# Patient Record
Sex: FEMALE | Race: WHITE | URBAN - METROPOLITAN AREA
[De-identification: names, ages, dates, MRNs, and addresses within clinical notes are randomized per-mention and may not be internally consistent; named-entity substitution may affect disease eponyms.]

---

## 2017-01-28 ENCOUNTER — APPOINTMENT (OUTPATIENT)
Dept: GENERAL RADIOLOGY | Facility: CLINIC | Age: 22
End: 2017-01-28
Attending: EMERGENCY MEDICINE
Payer: COMMERCIAL

## 2017-01-28 ENCOUNTER — HOSPITAL ENCOUNTER (EMERGENCY)
Facility: CLINIC | Age: 22
Discharge: HOME | End: 2017-01-28
Attending: EMERGENCY MEDICINE | Admitting: EMERGENCY MEDICINE
Payer: COMMERCIAL

## 2017-01-28 ENCOUNTER — APPOINTMENT (OUTPATIENT)
Dept: ULTRASOUND IMAGING | Facility: CLINIC | Age: 22
End: 2017-01-28
Attending: EMERGENCY MEDICINE
Payer: COMMERCIAL

## 2017-01-28 VITALS
HEART RATE: 75 BPM | DIASTOLIC BLOOD PRESSURE: 72 MMHG | TEMPERATURE: 98 F | OXYGEN SATURATION: 100 % | RESPIRATION RATE: 18 BRPM | WEIGHT: 115 LBS | BODY MASS INDEX: 21.71 KG/M2 | HEIGHT: 61 IN | SYSTOLIC BLOOD PRESSURE: 104 MMHG

## 2017-01-28 DIAGNOSIS — M25.511 ACUTE PAIN OF RIGHT SHOULDER: ICD-10-CM

## 2017-01-28 DIAGNOSIS — R10.11 RUQ ABDOMINAL PAIN: ICD-10-CM

## 2017-01-28 LAB
ALBUMIN SERPL-MCNC: 4.7 G/DL (ref 3.4–5)
ALBUMIN UR-MCNC: NEGATIVE MG/DL
ALP SERPL-CCNC: 79 U/L (ref 40–150)
ALT SERPL W P-5'-P-CCNC: 22 U/L (ref 0–50)
ANION GAP SERPL CALCULATED.3IONS-SCNC: 8 MMOL/L (ref 3–14)
APPEARANCE UR: CLEAR
AST SERPL W P-5'-P-CCNC: 11 U/L (ref 0–35)
BASOPHILS # BLD AUTO: 0 10E9/L (ref 0–0.2)
BASOPHILS NFR BLD AUTO: 0.3 %
BILIRUB SERPL-MCNC: 0.7 MG/DL (ref 0.2–1.3)
BILIRUB UR QL STRIP: NEGATIVE
BUN SERPL-MCNC: 9 MG/DL (ref 7–30)
CALCIUM SERPL-MCNC: 9.3 MG/DL (ref 8.5–10.1)
CHLORIDE SERPL-SCNC: 104 MMOL/L (ref 96–110)
CO2 SERPL-SCNC: 29 MMOL/L (ref 20–32)
COLOR UR AUTO: ABNORMAL
CREAT SERPL-MCNC: 0.63 MG/DL (ref 0.5–1)
DIFFERENTIAL METHOD BLD: NORMAL
EOSINOPHIL # BLD AUTO: 0.1 10E9/L (ref 0–0.7)
EOSINOPHIL NFR BLD AUTO: 1.1 %
ERYTHROCYTE [DISTWIDTH] IN BLOOD BY AUTOMATED COUNT: 12.2 % (ref 10–15)
GFR SERPL CREATININE-BSD FRML MDRD: ABNORMAL ML/MIN/1.7M2
GLUCOSE SERPL-MCNC: 118 MG/DL (ref 70–99)
GLUCOSE UR STRIP-MCNC: NEGATIVE MG/DL
HCG UR QL: NEGATIVE
HCT VFR BLD AUTO: 41.5 % (ref 35–47)
HGB BLD-MCNC: 14.2 G/DL (ref 11.7–15.7)
HGB UR QL STRIP: NEGATIVE
IMM GRANULOCYTES # BLD: 0 10E9/L (ref 0–0.4)
IMM GRANULOCYTES NFR BLD: 0.1 %
KETONES UR STRIP-MCNC: NEGATIVE MG/DL
LEUKOCYTE ESTERASE UR QL STRIP: NEGATIVE
LIPASE SERPL-CCNC: 108 U/L (ref 73–393)
LYMPHOCYTES # BLD AUTO: 2.1 10E9/L (ref 0.8–5.3)
LYMPHOCYTES NFR BLD AUTO: 23.7 %
MCH RBC QN AUTO: 28.8 PG (ref 26.5–33)
MCHC RBC AUTO-ENTMCNC: 34.2 G/DL (ref 31.5–36.5)
MCV RBC AUTO: 84 FL (ref 78–100)
MONOCYTES # BLD AUTO: 0.6 10E9/L (ref 0–1.3)
MONOCYTES NFR BLD AUTO: 6.3 %
NEUTROPHILS # BLD AUTO: 6 10E9/L (ref 1.6–8.3)
NEUTROPHILS NFR BLD AUTO: 68.5 %
NITRATE UR QL: NEGATIVE
PH UR STRIP: 7.5 PH (ref 5–7)
PLATELET # BLD AUTO: 281 10E9/L (ref 150–450)
POTASSIUM SERPL-SCNC: 3.4 MMOL/L (ref 3.4–5.3)
PROT SERPL-MCNC: 8.6 G/DL (ref 6.8–8.8)
RBC # BLD AUTO: 4.93 10E12/L (ref 3.8–5.2)
SODIUM SERPL-SCNC: 141 MMOL/L (ref 133–144)
SP GR UR STRIP: 1 (ref 1–1.03)
URN SPEC COLLECT METH UR: ABNORMAL
UROBILINOGEN UR STRIP-MCNC: NORMAL MG/DL (ref 0–2)
WBC # BLD AUTO: 8.7 10E9/L (ref 4–11)

## 2017-01-28 PROCEDURE — 83690 ASSAY OF LIPASE: CPT | Performed by: EMERGENCY MEDICINE

## 2017-01-28 PROCEDURE — 81003 URINALYSIS AUTO W/O SCOPE: CPT | Performed by: EMERGENCY MEDICINE

## 2017-01-28 PROCEDURE — 76705 ECHO EXAM OF ABDOMEN: CPT

## 2017-01-28 PROCEDURE — 71020 XR CHEST 2 VW: CPT

## 2017-01-28 PROCEDURE — 99285 EMERGENCY DEPT VISIT HI MDM: CPT | Mod: 25

## 2017-01-28 PROCEDURE — 80053 COMPREHEN METABOLIC PANEL: CPT | Performed by: EMERGENCY MEDICINE

## 2017-01-28 PROCEDURE — 85025 COMPLETE CBC W/AUTO DIFF WBC: CPT | Performed by: EMERGENCY MEDICINE

## 2017-01-28 PROCEDURE — 81025 URINE PREGNANCY TEST: CPT | Performed by: EMERGENCY MEDICINE

## 2017-01-28 PROCEDURE — 99284 EMERGENCY DEPT VISIT MOD MDM: CPT | Performed by: EMERGENCY MEDICINE

## 2017-01-28 ASSESSMENT — ENCOUNTER SYMPTOMS
FREQUENCY: 1
ARTHRALGIAS: 1
HEMATURIA: 0
DYSURIA: 0
APPETITE CHANGE: 0
ABDOMINAL PAIN: 1
VOMITING: 0
NAUSEA: 0
DIARRHEA: 0
FEVER: 0

## 2017-01-29 NOTE — ED NOTES
URQ and rt emelyer pain, has been going on for over 2 weeks ago the pain comes together. Pain comes and goes

## 2017-01-29 NOTE — ED PROVIDER NOTES
History     Chief Complaint   Patient presents with     Abdominal Pain     URQ and rt sholder pain, has been going on for over 2 weeks ago the pain comes together      HPI  Cady Yee is a 21 year old female who presents to the ED for evaluation of abdominal pain. Patient reports that she started to have pain in her RUQ two weeks ago as well as pain in the right shoulder. Patient states that who two pain started somewhat simultaneously. She describes the pain in her abdomen as a cramping pain, and notes that when the pain is worse in her abdomen, it also worsens in the right shoulder. She notes that her right shoulder pain is located around the AC joint and feels like more of a soreness or throbbing. Patient states that her abdomen pain did go away shortly after onset, but today it came back which is what brought her into the ED. She states that food does not aggravate or trigger her pain. Patients last menstrual period was 2-3 weeks ago, and she notes that her periods have been regular. Patient also states that she has been urinating more than usual. She has a family history of kidney stones in her mother. She denies fever, nausea, vomiting, appetite change, dysuria, hematuria, and trauma to areas of pain.    I have reviewed the Medications, Allergies, Past Medical and Surgical History, and Social History in the Epic system.  There is no problem list on file for this patient.    Current Outpatient Prescriptions   Medication Sig Dispense Refill     oxyCODONE-acetaminophen (PERCOCET) 5-325 MG per tablet Take 1-2 tablets by mouth every 4 hours as needed for pain 8 tablet 0     No Known Allergies    Review of Systems   Constitutional: Negative for fever and appetite change.   Gastrointestinal: Positive for abdominal pain (RUQ ). Negative for nausea, vomiting and diarrhea.   Genitourinary: Positive for frequency. Negative for dysuria and hematuria.   Musculoskeletal: Positive for arthralgias (right shoulder ).        Physical Exam   BP: 143/88 mmHg  Heart Rate: 76  Temp: 98  F (36.7  C)  SpO2: 100 %  Physical Exam  Appears anxious  Head:  Normocephalic.    Eyes:  PERRLA, full EOM.  External exams normal.    Ears:  Normal pinnae, canals, and TM's.    Nose:  Patent, without deformity.    Throat:  Moist mucous membranes without lesions, erythema, or exudate.    Neck:  Supple, without masses, lymphadenopathy or tenderness.    Respiratory:  Normal respiratory effort.  Lungs are clear with good breath sounds.    Heart:  RR without murmurs, rubs, or gallops.    Abdomen:  The abdomen was flat, soft and nontender without guarding rebound or masses.    No CVA tenderness  Skin is free of any rash  Musculoskeletal: Normal range of motion right shoulder.  No tenderness palpating along the right clavicle.  No discomfort at the coracoid process or AC joint.  No tenderness palpating scapular area.  She had no impingement sign.  She is normocephalic edema and normal strength.  ED Course   Procedures               Labs Ordered and Resulted from Time of ED Arrival Up to the Time of Departure from the ED   COMPREHENSIVE METABOLIC PANEL - Abnormal; Notable for the following:     Glucose 118 (*)     All other components within normal limits   URINE MACROSCOPIC WITH REFLEX TO MICRO - Abnormal; Notable for the following:     pH Urine 7.5 (*)     All other components within normal limits   CBC WITH PLATELETS DIFFERENTIAL   LIPASE   HCG QUALITATIVE URINE     Results for orders placed or performed during the hospital encounter of 01/28/17 (from the past 24 hour(s))   UA reflex to Microscopic   Result Value Ref Range    Color Urine Straw     Appearance Urine Clear     Glucose Urine Negative NEG mg/dL    Bilirubin Urine Negative NEG    Ketones Urine Negative NEG mg/dL    Specific Gravity Urine 1.003 1.003 - 1.035    Blood Urine Negative NEG    pH Urine 7.5 (H) 5.0 - 7.0 pH    Protein Albumin Urine Negative NEG mg/dL    Urobilinogen mg/dL Normal 0.0 -  2.0 mg/dL    Nitrite Urine Negative NEG    Leukocyte Esterase Urine Negative NEG    Source Midstream Urine    HCG qualitative urine   Result Value Ref Range    HCG Qual Urine Negative NEG   CBC with platelets differential   Result Value Ref Range    WBC 8.7 4.0 - 11.0 10e9/L    RBC Count 4.93 3.8 - 5.2 10e12/L    Hemoglobin 14.2 11.7 - 15.7 g/dL    Hematocrit 41.5 35.0 - 47.0 %    MCV 84 78 - 100 fl    MCH 28.8 26.5 - 33.0 pg    MCHC 34.2 31.5 - 36.5 g/dL    RDW 12.2 10.0 - 15.0 %    Platelet Count 281 150 - 450 10e9/L    Diff Method Automated Method     % Neutrophils 68.5 %    % Lymphocytes 23.7 %    % Monocytes 6.3 %    % Eosinophils 1.1 %    % Basophils 0.3 %    % Immature Granulocytes 0.1 %    Absolute Neutrophil 6.0 1.6 - 8.3 10e9/L    Absolute Lymphocytes 2.1 0.8 - 5.3 10e9/L    Absolute Monocytes 0.6 0.0 - 1.3 10e9/L    Absolute Eosinophils 0.1 0.0 - 0.7 10e9/L    Absolute Basophils 0.0 0.0 - 0.2 10e9/L    Abs Immature Granulocytes 0.0 0 - 0.4 10e9/L   Comprehensive metabolic panel   Result Value Ref Range    Sodium 141 133 - 144 mmol/L    Potassium 3.4 3.4 - 5.3 mmol/L    Chloride 104 96 - 110 mmol/L    Carbon Dioxide 29 20 - 32 mmol/L    Anion Gap 8 3 - 14 mmol/L    Glucose 118 (H) 70 - 99 mg/dL    Urea Nitrogen 9 7 - 30 mg/dL    Creatinine 0.63 0.50 - 1.00 mg/dL    GFR Estimate >90  Non  GFR Calc   >60 mL/min/1.7m2    GFR Estimate If Black >90   GFR Calc   >60 mL/min/1.7m2    Calcium 9.3 8.5 - 10.1 mg/dL    Bilirubin Total 0.7 0.2 - 1.3 mg/dL    Albumin 4.7 3.4 - 5.0 g/dL    Protein Total 8.6 6.8 - 8.8 g/dL    Alkaline Phosphatase 79 40 - 150 U/L    ALT 22 0 - 50 U/L    AST 11 0 - 35 U/L   Lipase   Result Value Ref Range    Lipase 108 73 - 393 U/L   US Abdomen Limited    Narrative    US ABDOMEN LIMITED 1/28/2017 8:55 PM    HISTORY: Right upper quadrant pain.    TECHNIQUE: Limited abdominal ultrasound to the right upper quadrant is  performed.    COMPARISON:  None.    FINDINGS: The liver is normal, measuring 13.3 cm in maximal diameter.  Visualized portions of the pancreas are unremarkable.    Normal gallbladder. No sonographic evidence of gallstones. Normal  gallbladder wall thickness. No sonographic Busby sign. Common hepatic  duct measures 4 mm in diameter.    Normal right kidney.      Impression    IMPRESSION:  Normal right upper quadrant ultrasound.      LAURY MORAN MD   XR Chest 2 Views    Narrative    XR CHEST 2 VW 1/28/2017 9:29 PM    HISTORY: Pain.    COMPARISON: None.      Impression    IMPRESSION: The lungs are clear. No focal pulmonary opacities. Heart  and mediastinum are unremarkable. No acute cardiopulmonary  abnormalities.    LAURY MORAN MD       Medications - No data to display    7:43 PM Patient assessed.   Assessments & Plan (with Medical Decision Making)  21-year-old female presents with two-week history for intermittent pain in her right shoulder as well as the right upper quadrant of her abdomen.  Patient denies any fever or chills.  No nausea or vomiting.  No postprandial symptoms.  Has had increased flatulence and concerns for constipation.  Has noted no jaundice. When her upper abdominal pain intensifies so does her shoulder.    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  No extensive repetitive type motion required.  Has had no falls or injury to the shoulder.  As noted no rash or soft tissue swelling.  Denies any neck pain referred pain from neck into the shoulder or arm.    Her examination right shoulder was unremarkable with normal range of motion and no sensory rotator cuff pathology.  Her abdominal examination was unremarkable.  She complained of no cough or chest congestion.    With her pain presenting both the upper abdomen on the right and right shoulder pain there was concern that this could represent referred pain along the phrenic nerve.  To evaluate the possibility of  any process within the thoracic cavity irritating the diaphragm she had a chest x-ray.  This was negative.  CBC was normal.  Metabolic panel and lipase are normal.  The right upper quadrant ultrasound was normal.    Further questioning revealed the patient has been constipated.  Her discomfort quadrant could be coming from, basic constipation and colonic gas in the care of the hepatic flexure and transverse colon.  There was no identified process that would be causing irritation around the diaphragm to cause referred pain along the phrenic nerve into the right shoulder.    Advised  a trial of Aleve for her right shoulder discomfort.  For constipation patient is improving her overall diet with both water intake and fiber.  I recommended starting Benefiber.  Maybe if necessary add MiraLAX.     I have reviewed the nursing notes.    I have reviewed the findings, diagnosis, plan and need for follow up with the patient.    Discharge Medication List as of 1/28/2017 10:15 PM          Final diagnoses:   Acute pain of right shoulder-myofascial strain   RUQ abdominal pain- suspect related to constipation transverse colon gas   This document serves as a record of the services and decisions personally performed and made by Ryan Rawls, *. It was created on HIS/HER behalf by   Barbie Mckenzie, a trained medical scribe. The creation of this document is based the provider's statements to the medical scribe.  Barbie Mckenzie 7:43 PM 1/28/2017    Provider:   The information in this document, created by the medical scribe for me, accurately reflects the services I personally performed and the decisions made by me. I have reviewed and approved this document for accuracy prior to leaving the patient care area.  Ryan Rawls, * 7:43 PM 1/28/2017 1/28/2017   [facility] EMERGENCY DEPARTMENT      Ryan Rawls, DO  01/29/17 0033

## 2017-01-29 NOTE — DISCHARGE INSTRUCTIONS
Aleve 2 tablets morning and evening for 5-7 days.  Take with food  Ice 15-30 minutes twice daily to the right shoulder  Increase fiber in diet by use of Citrucel or Benefiber  Drink more water

## 2017-01-29 NOTE — ED NOTES
Pt left ER ambulatory with mother, left without written DC instructions but had received verbal instructions from MD.

## 2017-01-29 NOTE — ED NOTES
Pt c/o sharp ruq pain that happened 2 weeks ago and than again today.  Pt also 2 weeks ago had sharp rt shoulder pain along with the abd pain.  Shoulder pain has been on/off for the past 2 weeks.  No fevers or recent illness.  No trauma/injury to abd.  No change in pain with eating.

## 2017-05-08 ENCOUNTER — OFFICE VISIT (OUTPATIENT)
Dept: ORTHOPEDICS | Facility: CLINIC | Age: 22
End: 2017-05-08
Payer: COMMERCIAL

## 2017-05-08 ENCOUNTER — RADIANT APPOINTMENT (OUTPATIENT)
Dept: GENERAL RADIOLOGY | Facility: CLINIC | Age: 22
End: 2017-05-08
Attending: PEDIATRICS
Payer: COMMERCIAL

## 2017-05-08 VITALS
WEIGHT: 114.3 LBS | SYSTOLIC BLOOD PRESSURE: 134 MMHG | HEIGHT: 62 IN | DIASTOLIC BLOOD PRESSURE: 74 MMHG | BODY MASS INDEX: 21.04 KG/M2 | HEART RATE: 118 BPM

## 2017-05-08 DIAGNOSIS — G25.89 SCAPULAR DYSKINESIS: ICD-10-CM

## 2017-05-08 DIAGNOSIS — M25.511 RIGHT SHOULDER PAIN: ICD-10-CM

## 2017-05-08 DIAGNOSIS — M75.101 ROTATOR CUFF SYNDROME, RIGHT: Primary | ICD-10-CM

## 2017-05-08 PROCEDURE — 99213 OFFICE O/P EST LOW 20 MIN: CPT | Performed by: PEDIATRICS

## 2017-05-08 PROCEDURE — 73030 X-RAY EXAM OF SHOULDER: CPT | Mod: RT

## 2017-05-08 NOTE — NURSING NOTE
"Chief Complaint   Patient presents with     Shoulder right       Initial /74  Pulse 118  Ht 5' 1.5\" (1.562 m)  Wt 114 lb 4.8 oz (51.8 kg)  BMI 21.25 kg/m2 Estimated body mass index is 21.25 kg/(m^2) as calculated from the following:    Height as of this encounter: 5' 1.5\" (1.562 m).    Weight as of this encounter: 114 lb 4.8 oz (51.8 kg).  Medication Reconciliation: complete    "

## 2017-05-08 NOTE — PROGRESS NOTES
Sports Medicine Clinic Visit    PCP: Juan Francisco Macdonald BRANDON Yee is a 21 year old female who is seen  as a self referral presenting with right shoulder pain.    Injury: Patient reports right shoulder pain for the past 5 months. She denies any specific injury, does report overuse. She is in [employer] school and using her right arm more. Her pain is lateral and deep into the joints.  Her pain is okay with work, which is very achy after work and with overhead movements.    Location of Pain: lateral shoulder, deep joint  Duration of Pain: 5 month(s)  Rating of Pain at worst: 8/10  Rating of Pain Currently: 5/10  Symptoms are better with: aleve and rest  Symptoms are worse with: all movements, touch  Additional Features:   Positive: none   Negative: swelling, bruising, popping, grinding, catching, locking, instability, paresthesias, numbness, weakness, pain in other joints and systemic symptoms  Other evaluation and/or treatments so far consists of: No Treatment tried to date  Prior History of related problems: trial of chiropractic care    Social History: [employer]    Review of Systems  Skin: no bruising, no swelling  Musculoskeletal: as above  Neurologic: no numbness, paresthesias  Remainder of review of systems is negative including constitutional, CV, pulmonary, GI, except as noted in HPI or medical history.    Patient's current problem list, past medical and surgical history, and family history were reviewed.    There is no problem list on file for this patient.    History reviewed. No pertinent past medical history.  History reviewed. No pertinent surgical history.  Family History   Problem Relation Age of Onset     Cancer - colorectal Maternal Grandmother 65     rectal     Hypertension Maternal Grandmother      Colon Cancer Maternal Grandmother      Cataracts Maternal Grandmother      Hyperlipidemia Maternal Grandmother      CANCER Paternal Grandfather      lung     Asthma Mother      Cataracts  "Maternal Grandfather      Hyperlipidemia Maternal Grandfather      Cataracts Paternal Grandmother        Objective  /74  Pulse 118  Ht 5' 1.5\" (1.562 m)  Wt 114 lb 4.8 oz (51.8 kg)  BMI 21.25 kg/m2    GENERAL APPEARANCE: healthy, alert and no distress   GAIT: NORMAL  SKIN: no suspicious lesions or rashes  HEENT: Sclera clear, anicteric  CV: good peripheral pulses  RESP: Breathing not labored  NEURO: Normal strength and tone, mentation intact and speech normal  PSYCH:  mentation appears normal and affect normal/bright    Bilateral Shoulder exam    Inspection and Posture:       normal    Skin:        no visible deformities    Tender:        acromioclavicular joint right       subacromial space right       proximal humerus right    Non Tender:       remainder of shoulder right    ROM:        Full active and passive ROM with flexion, extension, abduction, internal and external rotation right       asymmetric scapular motion    Painful motions:       end range flexion and elevation right    Strength:        abduction 4/5 right       flexion 5/5 right       internal rotation 5/5 right       external rotation 5/5 right    Impingement testing:       neg (-) Neer right       positive (+) Guzman right       positive (+) crossover right       positive (+) O'lisa right    Sensation:        normal sensation over shoulder and upper extremity       Radiology  I ordered, visualized and reviewed these images with the patient  XR SHOULDER RT G/E 3 VW 5/8/2017 10:26 AM      HISTORY: Pain in right shoulder         IMPRESSION: Borderline widening of the acromioclavicular joint. The  shoulder otherwise appears within normal limits.    Assessment:  1. Rotator cuff syndrome, right    2. Scapular dyskinesis      Low suspicion for rotator cuff tear given current history and exam.  Possible labral tear, though no injury. Evidence of rotator cuff dysfunction and scapular dyskinesia on exam.  Recommended rest from irritating " activities coupled with physical therapy.  Would consider further imaging or treatment pending clinical course.    Plan:  - Today's Plan of Care:  Rehab: [facility]: [facility] - [phone]    -We also discussed other future treatment options:  MRI of the right shoulder if not improving  Contact us if needing a note for school and work    Follow Up: 6 weeks    Concerning signs and symptoms were reviewed.  The patient expressed understanding of this management plan and all questions were answered at this time.    Trish Travis MD CAQ  Primary Care Sports Medicine  [facility]

## 2017-05-08 NOTE — PATIENT INSTRUCTIONS
Plan:  - Today's Plan of Care:  Rehab: Physical Therapy: [facility] - [phone]    -We also discussed other future treatment options:  MRI of the right shoulder if not improving    Follow Up: 6 weeks    If you have any further questions for your physician or physician s care team you can call [phone] and use option 3 to leave a voice message. Calls received during business hours will be returned same day.

## 2017-05-08 NOTE — MR AVS SNAPSHOT
After Visit Summary   5/8/2017    Cady Yee    MRN: [id number]           Patient Information     Date Of Birth          1995        Visit Information        Provider Department      5/8/2017 10:00 AM Trish Travis MD [facility]        Today's Diagnoses     Rotator cuff syndrome, right    -  1    Scapular dyskinesis          Care Instructions    Plan:  - Today's Plan of Care:  Rehab: Physical Therapy: [facility] - [phone]    -We also discussed other future treatment options:  MRI of the right shoulder if not improving    Follow Up: 6 weeks    If you have any further questions for your physician or physician s care team you can call [phone] and use option 3 to leave a voice message. Calls received during business hours will be returned same day.          Follow-ups after your visit        Additional Services     PHYSICAL THERAPY REFERRAL       *This therapy referral will be filtered to a centralized scheduling office at [facility] and the patient will receive a call to schedule an appointment at a [place] location most convenient for them. *     [facility] provides Physical Therapy evaluation and treatment and many specialty services across the [place] system.  If requesting a specialty program, please choose from the list below.    If you have not heard from the scheduling office within 2 business days, please call [phone] for all locations, with the exception of [place], please call [phone].  Treatment: Evaluation & Treatment  Special Instructions/Modalities: evaluate and treat  Special Programs: None    Please be aware that coverage of these services is subject to the terms and limitations of your health insurance plan.  Call member services at your health plan with any benefit or coverage questions.      **Note to Provider:  If you are referring outside of [place] for the therapy  "appointment, please list the name of the location in the  special instructions  above, print the referral and give to the patient to schedule the appointment.                  Who to contact     If you have questions or need follow up information about today's clinic visit or your schedule please contact [facility] directly at [phone].  Normal or non-critical lab and imaging results will be communicated to you by MyChart, letter or phone within 4 business days after the clinic has received the results. If you do not hear from us within 7 days, please contact the clinic through [employer] or phone. If you have a critical or abnormal lab result, we will notify you by phone as soon as possible.  Submit refill requests through [employer] or call your pharmacy and they will forward the refill request to us. Please allow 3 business days for your refill to be completed.          Additional Information About Your Visit        MyChart Information     [employer] lets you send messages to your doctor, view your test results, renew your prescriptions, schedule appointments and more. To sign up, go to [email or web address] . Click on \"Log in\" on the left side of the screen, which will take you to the Welcome page. Then click on \"Sign up Now\" on the right side of the page.     You will be asked to enter the access code listed below, as well as some personal information. Please follow the directions to create your username and password.     Your access code is: [email or web address]  Expires: 2017 10:37 AM     Your access code will  in 90 days. If you need help or a new code, please call your [place] clinic or [phone].        Care EveryWhere ID     This is your Care EveryWhere ID. This could be used by other organizations to access your [place] medical records  [id number]        Your Vitals Were     Pulse Height BMI (Body Mass Index)             118 5' 1.5\" (1.562 m) 21.25 kg/m2          " Blood Pressure from Last 3 Encounters:   05/08/17 134/74   01/28/17 104/72   01/12/16 102/70    Weight from Last 3 Encounters:   05/08/17 114 lb 4.8 oz (51.8 kg) (24 %)*   01/28/17 115 lb (52.2 kg) (26 %)*   01/04/16 110 lb (49.9 kg) (20 %)*     * Growth percentiles are based on [facility] 2-20 Years data.              We Performed the Following     PHYSICAL THERAPY REFERRAL        Primary Care Provider Office Phone # Fax #    Juan Francisco Macdonald [phone]       [facility] [address] [place] [id number]        Thank you!     Thank you for choosing [facility]  for your care. Our goal is always to provide you with excellent care. Hearing back from our patients is one way we can continue to improve our services. Please take a few minutes to complete the written survey that you may receive in the mail after your visit with us. Thank you!             Your Updated Medication List - Protect others around you: Learn how to safely use, store and throw away your medicines at www.disposemymeds.org.          This list is accurate as of: 5/8/17 10:37 AM.  Always use your most recent med list.                   Brand Name Dispense Instructions for use    oxyCODONE-acetaminophen 5-325 MG per tablet    PERCOCET    8 tablet    Take 1-2 tablets by mouth every 4 hours as needed for pain

## 2017-05-15 ENCOUNTER — HOSPITAL ENCOUNTER (OUTPATIENT)
Dept: PHYSICAL THERAPY | Facility: CLINIC | Age: 22
Setting detail: THERAPIES SERIES
End: 2017-05-15
Attending: PEDIATRICS
Payer: COMMERCIAL

## 2017-05-15 PROCEDURE — 40000718 ZZHC STATISTIC PT DEPARTMENT ORTHO VISIT: Performed by: PHYSICAL THERAPIST

## 2017-05-15 PROCEDURE — 97162 PT EVAL MOD COMPLEX 30 MIN: CPT | Mod: GP | Performed by: PHYSICAL THERAPIST

## 2017-05-15 PROCEDURE — 97110 THERAPEUTIC EXERCISES: CPT | Mod: GP | Performed by: PHYSICAL THERAPIST

## 2017-05-15 NOTE — PROGRESS NOTES
05/15/17 1400   General Information   Type of Visit Initial OP Ortho PT Evaluation   Start of Care Date 05/15/17   Referring Physician Trish Travis MD   Patient/Family Goals Statement To be able to work w/o pain   Orders Evaluate and Treat   Date of Order 05/08/17   Insurance Type [employer]   Medical Diagnosis R RC syndrome, scapular dyskinesia   Body Part(s)   Body Part(s) Shoulder   Presentation and Etiology   Pertinent history of current problem (include personal factors and/or comorbidities that impact the POC) Pt presents w/ R shoulder pain that came on gradually starting in Dec 2016.  Pain is intermittent 8/10 lateral shoulder and deep inside the joint.   No neck pain but can have intermittent stiffness.   Negative numbness/ tingling.   Xray in chart unremarkable.   Meds:  alleve, ibuprofen prn (uses rarely).  Pt is R dominant.  PMHX:  unremarkable.  Moderate complexity:  UE use in school   Impairments A. Pain;B. Decreased WB tolerance;D. Decreased ROM;F. Decreased strength and endurance   Pain quality B. Dull;C. Aching;F. Stabbing   Pain exacerbation comment Reaching above shoulder height.   Taking off overhead shirt.  Unable to lie on R side.  Wakes 1X/night.  Lifting a gallon of milk.  Reaching up behind back to hook a bra   Pain/symptoms eased by A. Sitting;C. Rest;F. Certain positions;I. OTC medication(s)   Progression of symptoms since onset: Worsened  (more pain,  more clients in class)   Prior Level of Function   Functional Level Prior Comment normally a sidesleeper.     Current Level of Function   Patient role/employment history A. Employed;B. 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     Fall Risk Screen   Fall screen completed by PT   Per patient - Fall 2 or more times in past year? No   Per patient - Fall with injury in past year? No   Is patient a fall risk? No   Shoulder Objective  Findings   Side (if bilateral, select both right and left) Right   Posture slight FH   Cervical Screen (ROM, quadrant) CROM flex/ ext/ B rot WNL,   notes a pull in UB w/ flexion   Scapulothoracic Rhythm slight decreased control on R (more pronounced w/ hands on hips position)   Neer's Test +   Guzman-Delbert Test neg   Coracoid Test neg   Pettis's Test +   Relocation Test neg   Crossover Test neg   Shoulder Special Tests Comments Empty can + pain and 4-/5 weakness, Lift off test neg.   Palpation Moderate tenderness R AC joint   Right Shoulder Flexion AROM B WNL pain at endrange on R   Right Shoulder Flexion PROM WNL   Right Shoulder Abduction AROM B WNL pain at endrange on R   Right Shoulder Abduction PROM WNL   Right Shoulder ER AROM B WNL   Right Shoulder ER PROM hypermobile and notes pain   Right Shoulder IR AROM R to T6,  L to T4   Right Shoulder IR PROM WNL   Right Shoulder Flexion Strength R 4-/5 w/ pain, L 5/5   Right Shoulder Abduction Strength R 4-/5 w/ pain , L 5-/5   Right Shoulder ER Strength R 5-/5, L 5/5   Right Shoulder IR Strength B 5/5   Right Mid Trapezius Strength R 4-/5, L 4/5   Right Lower Trapezius Strength R  unable to lift arm off bed,  L 4-/5   Planned Therapy Interventions   Planned Therapy Interventions strengthening;ROM;manual therapy   Clinical Impression   Criteria for Skilled Therapeutic Interventions Met yes, treatment indicated   PT Diagnosis R shoulder pain   Influenced by the following impairments pain, decreased strength   Functional limitations due to impairments reaching, dressing, lifting,  doing hair   Clinical Presentation Evolving/Changing   Clinical Presentation Rationale increasing symptoms w/ work tasks   Clinical Decision Making (Complexity) Moderate complexity   Therapy Frequency other (see comments)   Predicted Duration of Therapy Intervention (days/wks) 1X / 2-3 weeks up to 4 visits   Risk & Benefits of therapy have been explained Yes   Patient, Family & other staff  in agreement with plan of care Yes   Education Assessment   Barriers to Learning No barriers   Ortho Goal 1   Goal Description 1. Pt will report taking off overhead shirt w/ pain no > 3/10   Target Date 07/14/17   Ortho Goal 2   Goal Description 2.  Reaching overhead w/ ADL's w/ pain no > 3/10   Target Date 07/14/17   Ortho Goal 3   Goal Description 3.  Pt able to lift gallon of milk w/ pain no > 3/10   Target Date 07/14/17   Ortho Goal 4   Goal Description 4.  Independent and consistent w/ HEP and increased awareness of posture   Target Date 07/14/17   Total Evaluation Time   Total Evaluation Time 20     Thank you for this referral,    Elizabeth Sandy, PT,  CEAS   #[id number]  [facility]  [phone]

## 2017-05-30 ENCOUNTER — HOSPITAL ENCOUNTER (OUTPATIENT)
Dept: PHYSICAL THERAPY | Facility: CLINIC | Age: 22
Setting detail: THERAPIES SERIES
End: 2017-05-30
Attending: PEDIATRICS
Payer: COMMERCIAL

## 2017-05-30 PROCEDURE — 40000718 ZZHC STATISTIC PT DEPARTMENT ORTHO VISIT: Performed by: PHYSICAL THERAPIST

## 2017-05-30 PROCEDURE — 97110 THERAPEUTIC EXERCISES: CPT | Mod: GP | Performed by: PHYSICAL THERAPIST

## 2017-06-30 ENCOUNTER — HOSPITAL ENCOUNTER (OUTPATIENT)
Dept: PHYSICAL THERAPY | Facility: CLINIC | Age: 22
Setting detail: THERAPIES SERIES
End: 2017-06-30
Attending: PEDIATRICS
Payer: COMMERCIAL

## 2017-06-30 PROCEDURE — 40000718 ZZHC STATISTIC PT DEPARTMENT ORTHO VISIT: Performed by: PHYSICAL THERAPIST

## 2017-06-30 PROCEDURE — 97110 THERAPEUTIC EXERCISES: CPT | Mod: GP | Performed by: PHYSICAL THERAPIST

## 2017-07-28 ENCOUNTER — HOSPITAL ENCOUNTER (OUTPATIENT)
Dept: PHYSICAL THERAPY | Facility: CLINIC | Age: 22
Setting detail: THERAPIES SERIES
End: 2017-07-28
Attending: PEDIATRICS
Payer: COMMERCIAL

## 2017-07-28 PROCEDURE — 40000718 ZZHC STATISTIC PT DEPARTMENT ORTHO VISIT: Performed by: PHYSICAL THERAPIST

## 2017-07-28 PROCEDURE — 97110 THERAPEUTIC EXERCISES: CPT | Mod: GP | Performed by: PHYSICAL THERAPIST

## 2017-08-21 ENCOUNTER — HOSPITAL ENCOUNTER (OUTPATIENT)
Dept: PHYSICAL THERAPY | Facility: CLINIC | Age: 22
Setting detail: THERAPIES SERIES
End: 2017-08-21
Attending: PEDIATRICS
Payer: COMMERCIAL

## 2017-08-21 PROCEDURE — 40000718 ZZHC STATISTIC PT DEPARTMENT ORTHO VISIT: Performed by: PHYSICAL THERAPIST

## 2017-08-21 PROCEDURE — 97110 THERAPEUTIC EXERCISES: CPT | Mod: GP | Performed by: PHYSICAL THERAPIST

## 2017-09-29 NOTE — ADDENDUM NOTE
Encounter addended by: Elizabeth Sandy, PT on: 9/29/2017  8:16 AM<BR>     Actions taken: Sign clinical note, Flowsheet accepted, Episode resolved

## 2017-09-29 NOTE — PROGRESS NOTES
OUTPATIENT PHYSICAL THERAPY DISCHARGE SUMMARY   Trish Travis MD 5/15/17 to 08/21/17 0900   Signing Clinician's Name / Credentials   Signing clinician's name / credentials Elizabeth Sandy,PT [id number]   Session Number   Session Number 5    Ortho Goal 1   Goal Description 1. Pt will report taking off overhead shirt w/ pain no > 3/10.  6/30/17 no problems recently unless she is sore already MET.   7/28/17  5/10  .  8/2/17  1/10 MET   Target Date 08/27/17   Ortho Goal 2   Goal Description 2.  Reaching overhead w/ ADL's w/ pain no > 3/10.  6/30/17 reaching up doesn't bother but prolonged overhead would.  7/28/17 2-3/10 MET   Target Date 07/14/17   Ortho Goal 3   Goal Description 3.  Pt able to lift gallon of milk w/ pain no > 3/10.  6/30/17 if she keeps the gallon close to her body then no complaints.   7/28/17 keeps the wt to her body then no complaints, Pt states she would not reach out w/ it..  8/21/17  No pain as she keeps it close to her.  MET   Target Date 08/27/17   Ortho Goal 4   Goal Description 4.  Independent and consistent w/ HEP and increased awareness of posture.   7/28/17  inconsistent.  8/21/17  MET   Target Date 08/27/17   Subjective Report   Subjective Report Pt states she is doing pretty good.   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  Intermittent pain 3/10.    Objective Measures   Details R shoulder MMT:  flex 5-/5 w/ slight pain 1/10,  abd 5- /5 w/ pain 2-3/10,  ER/ IR 5/5.  LT  R 3+/5   mid trap 4+/5   Objective Measure R shoulder AROM  WNL ( as checked on 7/28/17)   Therapeutic Procedure/exercise   Treatment Detail Prone shoulder ext 1# X 30,   ER in SL without towel 1# X 20.       GTB shoulder ext w/ LT set X 25.  shoulder flex 1# X 25.  scaption 1# X 20.   Wall push up + x 10.   GTB rows X 20.  2# overhead lift X 20   Equipment Needs   Equipment Needs GTB issued.    Plan   Home program ex as above, ice, posture.     Plan Pt to cont on own w/ HEP.   Discharge from physical therapy.   Comments   Comments  Pt has met goals

## 2018-02-10 ENCOUNTER — HEALTH MAINTENANCE LETTER (OUTPATIENT)
Age: 23
End: 2018-02-10

## 2018-03-03 ENCOUNTER — HEALTH MAINTENANCE LETTER (OUTPATIENT)
Age: 23
End: 2018-03-03